# Patient Record
Sex: FEMALE | Race: WHITE | Employment: PART TIME | URBAN - NONMETROPOLITAN AREA
[De-identification: names, ages, dates, MRNs, and addresses within clinical notes are randomized per-mention and may not be internally consistent; named-entity substitution may affect disease eponyms.]

---

## 2017-01-05 DIAGNOSIS — S82.891S CLOSED RIGHT ANKLE FRACTURE, SEQUELA: Primary | ICD-10-CM

## 2017-01-10 ENCOUNTER — OFFICE VISIT (OUTPATIENT)
Dept: ORTHOPEDIC SURGERY | Age: 37
End: 2017-01-10

## 2017-01-10 VITALS
DIASTOLIC BLOOD PRESSURE: 90 MMHG | HEIGHT: 66 IN | HEART RATE: 89 BPM | SYSTOLIC BLOOD PRESSURE: 130 MMHG | BODY MASS INDEX: 24.11 KG/M2 | WEIGHT: 150 LBS

## 2017-01-10 DIAGNOSIS — S82.891S CLOSED RIGHT ANKLE FRACTURE, SEQUELA: Primary | ICD-10-CM

## 2017-01-10 PROCEDURE — 99024 POSTOP FOLLOW-UP VISIT: CPT | Performed by: PHYSICIAN ASSISTANT

## 2017-01-10 RX ORDER — SENNOSIDES 8.6 MG
650 CAPSULE ORAL EVERY 8 HOURS PRN
COMMUNITY
End: 2019-09-11

## 2017-01-10 RX ORDER — OXYCODONE HYDROCHLORIDE AND ACETAMINOPHEN 5; 325 MG/1; MG/1
1 TABLET ORAL EVERY 6 HOURS PRN
COMMUNITY
End: 2019-09-11

## 2017-01-16 ENCOUNTER — OFFICE VISIT (OUTPATIENT)
Dept: ORTHOPEDIC SURGERY | Age: 37
End: 2017-01-16

## 2017-01-16 VITALS
BODY MASS INDEX: 24.11 KG/M2 | HEART RATE: 88 BPM | SYSTOLIC BLOOD PRESSURE: 110 MMHG | DIASTOLIC BLOOD PRESSURE: 82 MMHG | WEIGHT: 150 LBS | HEIGHT: 66 IN

## 2017-01-16 DIAGNOSIS — S82.891S CLOSED RIGHT ANKLE FRACTURE, SEQUELA: Primary | ICD-10-CM

## 2017-01-16 PROCEDURE — 99024 POSTOP FOLLOW-UP VISIT: CPT | Performed by: PHYSICIAN ASSISTANT

## 2017-01-16 RX ORDER — CEPHALEXIN 500 MG/1
500 CAPSULE ORAL 4 TIMES DAILY
Qty: 40 CAPSULE | Refills: 0 | Status: SHIPPED | OUTPATIENT
Start: 2017-01-16 | End: 2017-03-20

## 2017-01-16 RX ORDER — HYDROCODONE BITARTRATE AND ACETAMINOPHEN 5; 325 MG/1; MG/1
1-2 TABLET ORAL EVERY 6 HOURS PRN
Qty: 50 TABLET | Refills: 0 | Status: SHIPPED | OUTPATIENT
Start: 2017-01-16 | End: 2017-01-23

## 2017-01-16 RX ORDER — METHYLPREDNISOLONE 4 MG/1
4 TABLET ORAL SEE ADMIN INSTRUCTIONS
Qty: 1 KIT | Refills: 0 | Status: SHIPPED | OUTPATIENT
Start: 2017-01-16 | End: 2017-01-22

## 2017-02-09 DIAGNOSIS — S82.891S CLOSED RIGHT ANKLE FRACTURE, SEQUELA: Primary | ICD-10-CM

## 2017-02-14 ENCOUNTER — OFFICE VISIT (OUTPATIENT)
Dept: ORTHOPEDIC SURGERY | Age: 37
End: 2017-02-14

## 2017-02-14 VITALS
BODY MASS INDEX: 24.11 KG/M2 | HEART RATE: 86 BPM | HEIGHT: 66 IN | WEIGHT: 150 LBS | DIASTOLIC BLOOD PRESSURE: 79 MMHG | SYSTOLIC BLOOD PRESSURE: 138 MMHG

## 2017-02-14 DIAGNOSIS — S82.891S CLOSED RIGHT ANKLE FRACTURE, SEQUELA: Primary | ICD-10-CM

## 2017-02-14 PROCEDURE — 99024 POSTOP FOLLOW-UP VISIT: CPT | Performed by: PHYSICIAN ASSISTANT

## 2017-02-14 RX ORDER — SULFAMETHOXAZOLE AND TRIMETHOPRIM 800; 160 MG/1; MG/1
1 TABLET ORAL 2 TIMES DAILY
Qty: 20 TABLET | Refills: 0 | Status: SHIPPED | OUTPATIENT
Start: 2017-02-14 | End: 2017-02-24

## 2017-02-23 DIAGNOSIS — S82.891S CLOSED RIGHT ANKLE FRACTURE, SEQUELA: Primary | ICD-10-CM

## 2017-02-28 ENCOUNTER — OFFICE VISIT (OUTPATIENT)
Dept: ORTHOPEDIC SURGERY | Age: 37
End: 2017-02-28

## 2017-02-28 VITALS
HEIGHT: 66 IN | DIASTOLIC BLOOD PRESSURE: 64 MMHG | HEART RATE: 80 BPM | BODY MASS INDEX: 24.11 KG/M2 | SYSTOLIC BLOOD PRESSURE: 118 MMHG | WEIGHT: 150 LBS

## 2017-02-28 DIAGNOSIS — S82.891S CLOSED RIGHT ANKLE FRACTURE, SEQUELA: Primary | ICD-10-CM

## 2017-02-28 PROCEDURE — 99024 POSTOP FOLLOW-UP VISIT: CPT | Performed by: PHYSICIAN ASSISTANT

## 2017-03-16 DIAGNOSIS — S82.891S CLOSED RIGHT ANKLE FRACTURE, SEQUELA: Primary | ICD-10-CM

## 2017-03-20 ENCOUNTER — OFFICE VISIT (OUTPATIENT)
Dept: ORTHOPEDIC SURGERY | Age: 37
End: 2017-03-20

## 2017-03-20 VITALS
DIASTOLIC BLOOD PRESSURE: 74 MMHG | WEIGHT: 150 LBS | BODY MASS INDEX: 24.11 KG/M2 | HEART RATE: 80 BPM | HEIGHT: 66 IN | SYSTOLIC BLOOD PRESSURE: 118 MMHG

## 2017-03-20 DIAGNOSIS — S82.891S CLOSED RIGHT ANKLE FRACTURE, SEQUELA: Primary | ICD-10-CM

## 2017-03-20 PROCEDURE — 99024 POSTOP FOLLOW-UP VISIT: CPT | Performed by: PHYSICIAN ASSISTANT

## 2017-03-24 ENCOUNTER — NURSE ONLY (OUTPATIENT)
Dept: ORTHOPEDIC SURGERY | Age: 37
End: 2017-03-24

## 2017-03-24 DIAGNOSIS — S91.001S ANKLE WOUND, RIGHT, SEQUELA: Primary | ICD-10-CM

## 2017-03-28 ENCOUNTER — OFFICE VISIT (OUTPATIENT)
Dept: ORTHOPEDIC SURGERY | Age: 37
End: 2017-03-28
Payer: COMMERCIAL

## 2017-03-28 VITALS
WEIGHT: 150 LBS | HEIGHT: 66 IN | HEART RATE: 68 BPM | SYSTOLIC BLOOD PRESSURE: 128 MMHG | BODY MASS INDEX: 24.11 KG/M2 | DIASTOLIC BLOOD PRESSURE: 82 MMHG

## 2017-03-28 DIAGNOSIS — S91.001S ANKLE WOUND, RIGHT, SEQUELA: Primary | ICD-10-CM

## 2017-03-28 PROCEDURE — 99212 OFFICE O/P EST SF 10 MIN: CPT | Performed by: PHYSICIAN ASSISTANT

## 2017-03-28 RX ORDER — SULFAMETHOXAZOLE AND TRIMETHOPRIM 800; 160 MG/1; MG/1
1 TABLET ORAL 2 TIMES DAILY
Qty: 20 TABLET | Refills: 0 | Status: SHIPPED | OUTPATIENT
Start: 2017-03-28 | End: 2017-04-07

## 2017-04-03 ENCOUNTER — OFFICE VISIT (OUTPATIENT)
Dept: ORTHOPEDIC SURGERY | Age: 37
End: 2017-04-03
Payer: COMMERCIAL

## 2017-04-03 VITALS
SYSTOLIC BLOOD PRESSURE: 125 MMHG | HEART RATE: 81 BPM | HEIGHT: 66 IN | BODY MASS INDEX: 24.11 KG/M2 | WEIGHT: 150 LBS | DIASTOLIC BLOOD PRESSURE: 83 MMHG

## 2017-04-03 DIAGNOSIS — S91.001S ANKLE WOUND, RIGHT, SEQUELA: Primary | ICD-10-CM

## 2017-04-03 PROCEDURE — 99212 OFFICE O/P EST SF 10 MIN: CPT | Performed by: PHYSICIAN ASSISTANT

## 2017-04-20 DIAGNOSIS — S82.891S CLOSED RIGHT ANKLE FRACTURE, SEQUELA: Primary | ICD-10-CM

## 2017-04-25 ENCOUNTER — OFFICE VISIT (OUTPATIENT)
Dept: ORTHOPEDIC SURGERY | Age: 37
End: 2017-04-25
Payer: COMMERCIAL

## 2017-04-25 VITALS
WEIGHT: 150 LBS | SYSTOLIC BLOOD PRESSURE: 130 MMHG | BODY MASS INDEX: 24.11 KG/M2 | DIASTOLIC BLOOD PRESSURE: 72 MMHG | HEIGHT: 66 IN

## 2017-04-25 DIAGNOSIS — S82.891S CLOSED RIGHT ANKLE FRACTURE, SEQUELA: Primary | ICD-10-CM

## 2017-04-25 PROCEDURE — 99212 OFFICE O/P EST SF 10 MIN: CPT | Performed by: PHYSICIAN ASSISTANT

## 2018-04-12 DIAGNOSIS — G89.29 CHRONIC PAIN OF RIGHT ANKLE: Primary | ICD-10-CM

## 2018-04-12 DIAGNOSIS — M25.571 CHRONIC PAIN OF RIGHT ANKLE: Primary | ICD-10-CM

## 2019-09-11 ENCOUNTER — OFFICE VISIT (OUTPATIENT)
Dept: FAMILY MEDICINE CLINIC | Age: 39
End: 2019-09-11
Payer: COMMERCIAL

## 2019-09-11 VITALS
BODY MASS INDEX: 28.99 KG/M2 | HEIGHT: 65 IN | SYSTOLIC BLOOD PRESSURE: 132 MMHG | DIASTOLIC BLOOD PRESSURE: 82 MMHG | HEART RATE: 100 BPM | WEIGHT: 174 LBS | OXYGEN SATURATION: 98 % | TEMPERATURE: 99.2 F

## 2019-09-11 DIAGNOSIS — L03.115 CELLULITIS OF RIGHT LOWER EXTREMITY: Primary | ICD-10-CM

## 2019-09-11 PROCEDURE — 99202 OFFICE O/P NEW SF 15 MIN: CPT | Performed by: NURSE PRACTITIONER

## 2019-09-11 ASSESSMENT — PATIENT HEALTH QUESTIONNAIRE - PHQ9
1. LITTLE INTEREST OR PLEASURE IN DOING THINGS: 0
SUM OF ALL RESPONSES TO PHQ9 QUESTIONS 1 & 2: 0
SUM OF ALL RESPONSES TO PHQ QUESTIONS 1-9: 0
2. FEELING DOWN, DEPRESSED OR HOPELESS: 0
SUM OF ALL RESPONSES TO PHQ QUESTIONS 1-9: 0

## 2019-09-11 ASSESSMENT — ENCOUNTER SYMPTOMS: COLOR CHANGE: 1

## 2019-09-11 NOTE — PROGRESS NOTES
Nausea Only     Ok if takes with benadryl    Betadine [Povidone Iodine] Rash       No exam data present    Subjective:      Review of Systems   Musculoskeletal: Positive for joint swelling. Skin: Positive for color change, rash and wound. Objective:     /82 (Site: Right Upper Arm, Position: Sitting, Cuff Size: Medium Adult)   Pulse 100   Temp 99.2 °F (37.3 °C)   Ht 5' 5\" (1.651 m)   Wt 174 lb (78.9 kg)   LMP 08/19/2019 (Exact Date)   SpO2 98%   Breastfeeding? No   BMI 28.96 kg/m²     Physical Exam   Constitutional: She appears well-developed and well-nourished. Musculoskeletal: She exhibits edema and tenderness. She exhibits no deformity. Right ankle: She exhibits swelling. She exhibits normal range of motion, no ecchymosis, no deformity, no laceration and normal pulse. Tenderness. Lateral malleolus tenderness found. Feet:    Skin: Skin is warm. Capillary refill takes less than 2 seconds. Rash noted. There is erythema. Assessment:      Diagnosis Orders   1. Cellulitis of right lower extremity       No results found for this visit on 09/11/19. Possible infected hardware. Plan:       Dr. Dav Krishnan, at [facility] will call you tomorrow with further instructions. Follow up with him as scheduled. [phone]    Update:  [facility] notified me that Dr. Ivet Nix would like to take the patient to the OR for irrigation of the area. Patient has already left the building. Patient to be contacted by [facility] to set up time tomorrow. Patient Instructions     Dr. Norris Gomez nurse, [facility], at [facility] will call you tomorrow with further instructions. Follow up with him as scheduled. [phone]      Patient Education        Cellulitis: Care Instructions  Your Care Instructions    Cellulitis is a skin infection caused by bacteria, most often strep or staph.  It often occurs after a break in the skin from a scrape, cut, bite, or puncture, or after a peroxide or alcohol, which can slow healing. · If you have swelling in your legs (edema), support stockings and good skin care may help prevent leg sores and cellulitis. · Take care of your feet, especially if you have diabetes or other conditions that increase the risk of infection. Wear shoes and socks. Do not go barefoot. If you have athlete's foot or other skin problems on your feet, talk to your doctor about how to treat them. When should you call for help? Call your doctor now or seek immediate medical care if:    · You have signs that your infection is getting worse, such as:  ? Increased pain, swelling, warmth, or redness. ? Red streaks leading from the area. ? Pus draining from the area. ? A fever.     · You get a rash.    Watch closely for changes in your health, and be sure to contact your doctor if:    · You do not get better as expected. Where can you learn more? Go to https://[employer]. org and sign in to your [employer] account. Enter C439 in the [employer] box to learn more about \"Cellulitis: Care Instructions. \"     If you do not have an account, please click on the \"Sign Up Now\" link. Current as of: April 1, 2019  Content Version: 12.1  © 2004-7356 Healthwise, Incorporated. Care instructions adapted under license by [facility] [place] ([facility]). If you have questions about a medical condition or this instruction, always ask your healthcare professional. [place] [address] any warranty or liability for your use of this information. Patient/Caregiver instructed on use, benefit, and side effects of prescribed medications. All patient/parent/caregiver questions answered. Patient/parent/caregiver voiced understanding. Reviewed health maintenance. Instructed to continue current medications, diet and exercise. Patient agreed with treatment plan. Follow up as directed.            Electronically signed by LOREN Levi CNP on9/11/2019

## 2019-09-12 DIAGNOSIS — M25.571 CHRONIC PAIN OF RIGHT ANKLE: ICD-10-CM

## 2019-09-12 DIAGNOSIS — Z01.818 PRE-OP TESTING: Primary | ICD-10-CM

## 2019-09-12 DIAGNOSIS — G89.29 CHRONIC PAIN OF RIGHT ANKLE: ICD-10-CM

## 2019-09-13 ENCOUNTER — HOSPITAL ENCOUNTER (OUTPATIENT)
Dept: LAB | Age: 39
Discharge: HOME | End: 2019-09-13
Payer: COMMERCIAL

## 2019-09-13 ENCOUNTER — HOSPITAL ENCOUNTER (OUTPATIENT)
Dept: NON INVASIVE DIAGNOSTICS | Age: 39
Discharge: HOME | End: 2019-09-13
Payer: COMMERCIAL

## 2019-09-13 DIAGNOSIS — Z01.818 PRE-OP TESTING: ICD-10-CM

## 2019-09-13 LAB
ABSOLUTE EOS #: 0.2 K/UL (ref 0–0.4)
ABSOLUTE IMMATURE GRANULOCYTE: NORMAL K/UL (ref 0–0.3)
ABSOLUTE LYMPH #: 1.7 K/UL (ref 1–4.8)
ABSOLUTE MONO #: 0.5 K/UL (ref 0.1–1.2)
ANION GAP SERPL CALCULATED.3IONS-SCNC: 11 MMOL/L (ref 9–17)
BASOPHILS # BLD: 1 % (ref 0–1)
BASOPHILS ABSOLUTE: 0 K/UL (ref 0–0.2)
BUN BLDV-MCNC: 11 MG/DL (ref 6–20)
BUN/CREAT BLD: 15 (ref 9–20)
CALCIUM SERPL-MCNC: 9.6 MG/DL (ref 8.6–10.4)
CHLORIDE BLD-SCNC: 104 MMOL/L (ref 98–107)
CO2: 25 MMOL/L (ref 20–31)
CREAT SERPL-MCNC: 0.72 MG/DL (ref 0.5–0.9)
DIFFERENTIAL TYPE: NORMAL
EKG ATRIAL RATE: 75 BPM
EKG P AXIS: 62 DEGREES
EKG P-R INTERVAL: 118 MS
EKG Q-T INTERVAL: 370 MS
EKG QRS DURATION: 80 MS
EKG QTC CALCULATION (BAZETT): 413 MS
EKG R AXIS: 54 DEGREES
EKG T AXIS: 24 DEGREES
EKG VENTRICULAR RATE: 75 BPM
EOSINOPHILS RELATIVE PERCENT: 3 % (ref 1–7)
GFR AFRICAN AMERICAN: >60 ML/MIN
GFR NON-AFRICAN AMERICAN: >60 ML/MIN
GFR SERPL CREATININE-BSD FRML MDRD: ABNORMAL ML/MIN/{1.73_M2}
GFR SERPL CREATININE-BSD FRML MDRD: ABNORMAL ML/MIN/{1.73_M2}
GLUCOSE BLD-MCNC: 105 MG/DL (ref 70–99)
HCT VFR BLD CALC: 39.5 % (ref 36–46)
HEMOGLOBIN: 13.1 G/DL (ref 12–16)
IMMATURE GRANULOCYTES: NORMAL %
LYMPHOCYTES # BLD: 24 % (ref 16–46)
MCH RBC QN AUTO: 29.1 PG (ref 26–34)
MCHC RBC AUTO-ENTMCNC: 33.1 G/DL (ref 31–37)
MCV RBC AUTO: 88 FL (ref 80–100)
MONOCYTES # BLD: 7 % (ref 4–11)
NRBC AUTOMATED: NORMAL PER 100 WBC
PDW BLD-RTO: 14.4 % (ref 11–14.5)
PLATELET # BLD: 352 K/UL (ref 140–450)
PLATELET ESTIMATE: NORMAL
PMV BLD AUTO: 7 FL (ref 6–12)
POTASSIUM SERPL-SCNC: 3.9 MMOL/L (ref 3.7–5.3)
RBC # BLD: 4.49 M/UL (ref 4–5.2)
RBC # BLD: NORMAL 10*6/UL
SEG NEUTROPHILS: 65 % (ref 43–77)
SEGMENTED NEUTROPHILS ABSOLUTE COUNT: 4.5 K/UL (ref 1.8–7.7)
SODIUM BLD-SCNC: 140 MMOL/L (ref 135–144)
WBC # BLD: 6.9 K/UL (ref 3.5–11)
WBC # BLD: NORMAL 10*3/UL

## 2019-09-13 PROCEDURE — 80048 BASIC METABOLIC PNL TOTAL CA: CPT

## 2019-09-13 PROCEDURE — 85025 COMPLETE CBC W/AUTO DIFF WBC: CPT

## 2019-09-13 PROCEDURE — 93005 ELECTROCARDIOGRAM TRACING: CPT

## 2019-09-13 PROCEDURE — 36415 COLL VENOUS BLD VENIPUNCTURE: CPT

## 2019-09-17 ENCOUNTER — APPOINTMENT (OUTPATIENT)
Dept: GENERAL RADIOLOGY | Age: 39
End: 2019-09-17
Attending: ORTHOPAEDIC SURGERY
Payer: COMMERCIAL

## 2019-09-17 ENCOUNTER — HOSPITAL ENCOUNTER (OUTPATIENT)
Age: 39
Setting detail: OUTPATIENT SURGERY
Discharge: HOME | End: 2019-09-17
Attending: ORTHOPAEDIC SURGERY | Admitting: ORTHOPAEDIC SURGERY
Payer: COMMERCIAL

## 2019-09-17 ENCOUNTER — ANESTHESIA EVENT (OUTPATIENT)
Dept: OPERATING ROOM | Age: 39
End: 2019-09-17
Payer: COMMERCIAL

## 2019-09-17 ENCOUNTER — HOSPITAL ENCOUNTER (OUTPATIENT)
Dept: GENERAL RADIOLOGY | Age: 39
Discharge: HOME | End: 2019-09-19
Payer: COMMERCIAL

## 2019-09-17 ENCOUNTER — OFFICE VISIT (OUTPATIENT)
Dept: ORTHOPEDIC SURGERY | Age: 39
End: 2019-09-17
Payer: COMMERCIAL

## 2019-09-17 ENCOUNTER — ANESTHESIA (OUTPATIENT)
Dept: OPERATING ROOM | Age: 39
End: 2019-09-17
Payer: COMMERCIAL

## 2019-09-17 VITALS
BODY MASS INDEX: 28.99 KG/M2 | SYSTOLIC BLOOD PRESSURE: 104 MMHG | WEIGHT: 174 LBS | DIASTOLIC BLOOD PRESSURE: 64 MMHG | HEART RATE: 74 BPM | HEIGHT: 65 IN

## 2019-09-17 VITALS
OXYGEN SATURATION: 98 % | RESPIRATION RATE: 16 BRPM | TEMPERATURE: 97.4 F | WEIGHT: 170 LBS | HEIGHT: 66 IN | DIASTOLIC BLOOD PRESSURE: 75 MMHG | SYSTOLIC BLOOD PRESSURE: 119 MMHG | HEART RATE: 82 BPM | BODY MASS INDEX: 27.32 KG/M2

## 2019-09-17 VITALS
SYSTOLIC BLOOD PRESSURE: 109 MMHG | OXYGEN SATURATION: 100 % | TEMPERATURE: 96.2 F | RESPIRATION RATE: 12 BRPM | DIASTOLIC BLOOD PRESSURE: 64 MMHG

## 2019-09-17 DIAGNOSIS — Z01.818 PRE-OP TESTING: ICD-10-CM

## 2019-09-17 DIAGNOSIS — T81.30XA WOUND DEHISCENCE: Primary | ICD-10-CM

## 2019-09-17 DIAGNOSIS — M25.571 CHRONIC PAIN OF RIGHT ANKLE: ICD-10-CM

## 2019-09-17 DIAGNOSIS — G89.29 CHRONIC PAIN OF RIGHT ANKLE: ICD-10-CM

## 2019-09-17 DIAGNOSIS — Z98.890 S/P HARDWARE REMOVAL: Primary | ICD-10-CM

## 2019-09-17 PROBLEM — T84.84XA PAINFUL ORTHOPAEDIC HARDWARE (HCC): Status: ACTIVE | Noted: 2019-09-17

## 2019-09-17 LAB — HCG(URINE) PREGNANCY TEST: NEGATIVE

## 2019-09-17 PROCEDURE — 6370000000 HC RX 637 (ALT 250 FOR IP): Performed by: NURSE ANESTHETIST, CERTIFIED REGISTERED

## 2019-09-17 PROCEDURE — 87186 SC STD MICRODIL/AGAR DIL: CPT

## 2019-09-17 PROCEDURE — 2580000003 HC RX 258: Performed by: ORTHOPAEDIC SURGERY

## 2019-09-17 PROCEDURE — 7100000000 HC PACU RECOVERY - FIRST 15 MIN: Performed by: ORTHOPAEDIC SURGERY

## 2019-09-17 PROCEDURE — 87205 SMEAR GRAM STAIN: CPT

## 2019-09-17 PROCEDURE — 87075 CULTR BACTERIA EXCEPT BLOOD: CPT

## 2019-09-17 PROCEDURE — 99212 OFFICE O/P EST SF 10 MIN: CPT | Performed by: PHYSICIAN ASSISTANT

## 2019-09-17 PROCEDURE — 7100000011 HC PHASE II RECOVERY - ADDTL 15 MIN: Performed by: ORTHOPAEDIC SURGERY

## 2019-09-17 PROCEDURE — 6370000000 HC RX 637 (ALT 250 FOR IP): Performed by: NURSE PRACTITIONER

## 2019-09-17 PROCEDURE — 7100000001 HC PACU RECOVERY - ADDTL 15 MIN: Performed by: ORTHOPAEDIC SURGERY

## 2019-09-17 PROCEDURE — 3600000002 HC SURGERY LEVEL 2 BASE: Performed by: ORTHOPAEDIC SURGERY

## 2019-09-17 PROCEDURE — 27641 PARTIAL REMOVAL OF FIBULA: CPT | Performed by: ORTHOPAEDIC SURGERY

## 2019-09-17 PROCEDURE — 73600 X-RAY EXAM OF ANKLE: CPT

## 2019-09-17 PROCEDURE — 3700000000 HC ANESTHESIA ATTENDED CARE: Performed by: ORTHOPAEDIC SURGERY

## 2019-09-17 PROCEDURE — 3700000001 HC ADD 15 MINUTES (ANESTHESIA): Performed by: ORTHOPAEDIC SURGERY

## 2019-09-17 PROCEDURE — 2500000003 HC RX 250 WO HCPCS: Performed by: ORTHOPAEDIC SURGERY

## 2019-09-17 PROCEDURE — 6360000002 HC RX W HCPCS: Performed by: NURSE ANESTHETIST, CERTIFIED REGISTERED

## 2019-09-17 PROCEDURE — 81025 URINE PREGNANCY TEST: CPT

## 2019-09-17 PROCEDURE — 87176 TISSUE HOMOGENIZATION CULTR: CPT

## 2019-09-17 PROCEDURE — 6360000002 HC RX W HCPCS: Performed by: ORTHOPAEDIC SURGERY

## 2019-09-17 PROCEDURE — 3209999900 FLUORO FOR SURGICAL PROCEDURES

## 2019-09-17 PROCEDURE — 2709999900 HC NON-CHARGEABLE SUPPLY: Performed by: ORTHOPAEDIC SURGERY

## 2019-09-17 PROCEDURE — 87070 CULTURE OTHR SPECIMN AEROBIC: CPT

## 2019-09-17 PROCEDURE — 7100000010 HC PHASE II RECOVERY - FIRST 15 MIN: Performed by: ORTHOPAEDIC SURGERY

## 2019-09-17 PROCEDURE — 73610 X-RAY EXAM OF ANKLE: CPT

## 2019-09-17 PROCEDURE — 2580000003 HC RX 258: Performed by: NURSE ANESTHETIST, CERTIFIED REGISTERED

## 2019-09-17 PROCEDURE — 6360000002 HC RX W HCPCS: Performed by: NURSE PRACTITIONER

## 2019-09-17 PROCEDURE — 86403 PARTICLE AGGLUT ANTBDY SCRN: CPT

## 2019-09-17 PROCEDURE — 3600000012 HC SURGERY LEVEL 2 ADDTL 15MIN: Performed by: ORTHOPAEDIC SURGERY

## 2019-09-17 RX ORDER — ACETAMINOPHEN 650 MG/1
SUPPOSITORY RECTAL PRN
Status: DISCONTINUED | OUTPATIENT
Start: 2019-09-17 | End: 2019-09-17 | Stop reason: SDUPTHER

## 2019-09-17 RX ORDER — SCOLOPAMINE TRANSDERMAL SYSTEM 1 MG/1
PATCH, EXTENDED RELEASE TRANSDERMAL PRN
Status: DISCONTINUED | OUTPATIENT
Start: 2019-09-17 | End: 2019-09-17 | Stop reason: SDUPTHER

## 2019-09-17 RX ORDER — DIPHENHYDRAMINE HYDROCHLORIDE 50 MG/ML
INJECTION INTRAMUSCULAR; INTRAVENOUS PRN
Status: DISCONTINUED | OUTPATIENT
Start: 2019-09-17 | End: 2019-09-17 | Stop reason: SDUPTHER

## 2019-09-17 RX ORDER — FENTANYL CITRATE 50 UG/ML
50 INJECTION, SOLUTION INTRAMUSCULAR; INTRAVENOUS EVERY 5 MIN PRN
Status: DISCONTINUED | OUTPATIENT
Start: 2019-09-17 | End: 2019-09-17 | Stop reason: HOSPADM

## 2019-09-17 RX ORDER — BUPIVACAINE HYDROCHLORIDE AND EPINEPHRINE 5; 5 MG/ML; UG/ML
INJECTION, SOLUTION EPIDURAL; INTRACAUDAL; PERINEURAL PRN
Status: DISCONTINUED | OUTPATIENT
Start: 2019-09-17 | End: 2019-09-17 | Stop reason: ALTCHOICE

## 2019-09-17 RX ORDER — SODIUM CHLORIDE 0.9 % (FLUSH) 0.9 %
10 SYRINGE (ML) INJECTION EVERY 12 HOURS SCHEDULED
Status: DISCONTINUED | OUTPATIENT
Start: 2019-09-17 | End: 2019-09-17 | Stop reason: HOSPADM

## 2019-09-17 RX ORDER — SODIUM CHLORIDE, SODIUM LACTATE, POTASSIUM CHLORIDE, CALCIUM CHLORIDE 600; 310; 30; 20 MG/100ML; MG/100ML; MG/100ML; MG/100ML
INJECTION, SOLUTION INTRAVENOUS CONTINUOUS PRN
Status: DISCONTINUED | OUTPATIENT
Start: 2019-09-17 | End: 2019-09-17 | Stop reason: SDUPTHER

## 2019-09-17 RX ORDER — KETOROLAC TROMETHAMINE 30 MG/ML
INJECTION, SOLUTION INTRAMUSCULAR; INTRAVENOUS PRN
Status: DISCONTINUED | OUTPATIENT
Start: 2019-09-17 | End: 2019-09-17 | Stop reason: SDUPTHER

## 2019-09-17 RX ORDER — HYDROCODONE BITARTRATE AND ACETAMINOPHEN 5; 325 MG/1; MG/1
1 TABLET ORAL PRN
Status: COMPLETED | OUTPATIENT
Start: 2019-09-17 | End: 2019-09-17

## 2019-09-17 RX ORDER — ONDANSETRON 2 MG/ML
4 INJECTION INTRAMUSCULAR; INTRAVENOUS PRN
Status: DISCONTINUED | OUTPATIENT
Start: 2019-09-17 | End: 2019-09-17 | Stop reason: HOSPADM

## 2019-09-17 RX ORDER — DIPHENHYDRAMINE HYDROCHLORIDE 50 MG/ML
12.5 INJECTION INTRAMUSCULAR; INTRAVENOUS
Status: DISCONTINUED | OUTPATIENT
Start: 2019-09-17 | End: 2019-09-17 | Stop reason: HOSPADM

## 2019-09-17 RX ORDER — ONDANSETRON 2 MG/ML
INJECTION INTRAMUSCULAR; INTRAVENOUS PRN
Status: DISCONTINUED | OUTPATIENT
Start: 2019-09-17 | End: 2019-09-17 | Stop reason: SDUPTHER

## 2019-09-17 RX ORDER — SODIUM CHLORIDE 9 MG/ML
INJECTION, SOLUTION INTRAVENOUS CONTINUOUS
Status: DISCONTINUED | OUTPATIENT
Start: 2019-09-17 | End: 2019-09-17 | Stop reason: HOSPADM

## 2019-09-17 RX ORDER — SODIUM CHLORIDE, SODIUM LACTATE, POTASSIUM CHLORIDE, CALCIUM CHLORIDE 600; 310; 30; 20 MG/100ML; MG/100ML; MG/100ML; MG/100ML
INJECTION, SOLUTION INTRAVENOUS CONTINUOUS
Status: DISCONTINUED | OUTPATIENT
Start: 2019-09-17 | End: 2019-09-17 | Stop reason: HOSPADM

## 2019-09-17 RX ORDER — SULFAMETHOXAZOLE AND TRIMETHOPRIM 800; 160 MG/1; MG/1
1 TABLET ORAL 2 TIMES DAILY
Qty: 20 TABLET | Refills: 0 | Status: SHIPPED | OUTPATIENT
Start: 2019-09-17 | End: 2019-09-17 | Stop reason: SDUPTHER

## 2019-09-17 RX ORDER — SODIUM CHLORIDE 0.9 % (FLUSH) 0.9 %
10 SYRINGE (ML) INJECTION PRN
Status: DISCONTINUED | OUTPATIENT
Start: 2019-09-17 | End: 2019-09-17 | Stop reason: HOSPADM

## 2019-09-17 RX ORDER — SULFAMETHOXAZOLE AND TRIMETHOPRIM 800; 160 MG/1; MG/1
1 TABLET ORAL 2 TIMES DAILY
Qty: 20 TABLET | Refills: 0 | Status: SHIPPED | OUTPATIENT
Start: 2019-09-17 | End: 2019-09-27

## 2019-09-17 RX ORDER — FENTANYL CITRATE 50 UG/ML
25 INJECTION, SOLUTION INTRAMUSCULAR; INTRAVENOUS EVERY 5 MIN PRN
Status: DISCONTINUED | OUTPATIENT
Start: 2019-09-17 | End: 2019-09-17 | Stop reason: HOSPADM

## 2019-09-17 RX ORDER — MIDAZOLAM HYDROCHLORIDE 1 MG/ML
INJECTION INTRAMUSCULAR; INTRAVENOUS PRN
Status: DISCONTINUED | OUTPATIENT
Start: 2019-09-17 | End: 2019-09-17 | Stop reason: SDUPTHER

## 2019-09-17 RX ORDER — FENTANYL CITRATE 50 UG/ML
INJECTION, SOLUTION INTRAMUSCULAR; INTRAVENOUS PRN
Status: DISCONTINUED | OUTPATIENT
Start: 2019-09-17 | End: 2019-09-17 | Stop reason: SDUPTHER

## 2019-09-17 RX ORDER — HYDROCODONE BITARTRATE AND ACETAMINOPHEN 5; 325 MG/1; MG/1
2 TABLET ORAL PRN
Status: COMPLETED | OUTPATIENT
Start: 2019-09-17 | End: 2019-09-17

## 2019-09-17 RX ORDER — DEXAMETHASONE SODIUM PHOSPHATE 4 MG/ML
INJECTION, SOLUTION INTRA-ARTICULAR; INTRALESIONAL; INTRAMUSCULAR; INTRAVENOUS; SOFT TISSUE PRN
Status: DISCONTINUED | OUTPATIENT
Start: 2019-09-17 | End: 2019-09-17 | Stop reason: SDUPTHER

## 2019-09-17 RX ORDER — HYDROCODONE BITARTRATE AND ACETAMINOPHEN 5; 325 MG/1; MG/1
1-2 TABLET ORAL
Qty: 30 TABLET | Refills: 0 | Status: SHIPPED | OUTPATIENT
Start: 2019-09-17 | End: 2019-09-24

## 2019-09-17 RX ADMIN — FENTANYL CITRATE 50 MCG: 50 INJECTION, SOLUTION INTRAMUSCULAR; INTRAVENOUS at 09:59

## 2019-09-17 RX ADMIN — HYDROMORPHONE HYDROCHLORIDE 0.25 MG: 1 INJECTION, SOLUTION INTRAMUSCULAR; INTRAVENOUS; SUBCUTANEOUS at 11:21

## 2019-09-17 RX ADMIN — SODIUM CHLORIDE, POTASSIUM CHLORIDE, SODIUM LACTATE AND CALCIUM CHLORIDE: 600; 310; 30; 20 INJECTION, SOLUTION INTRAVENOUS at 11:14

## 2019-09-17 RX ADMIN — SCOPALAMINE 1 PATCH: 1 PATCH, EXTENDED RELEASE TRANSDERMAL at 09:17

## 2019-09-17 RX ADMIN — FENTANYL CITRATE 50 MCG: 50 INJECTION, SOLUTION INTRAMUSCULAR; INTRAVENOUS at 09:50

## 2019-09-17 RX ADMIN — DIPHENHYDRAMINE HYDROCHLORIDE 15 MG: 50 INJECTION, SOLUTION INTRAMUSCULAR; INTRAVENOUS at 10:05

## 2019-09-17 RX ADMIN — MIDAZOLAM HYDROCHLORIDE 1 MG: 1 INJECTION, SOLUTION INTRAMUSCULAR; INTRAVENOUS at 09:50

## 2019-09-17 RX ADMIN — SODIUM CHLORIDE, POTASSIUM CHLORIDE, SODIUM LACTATE AND CALCIUM CHLORIDE: 600; 310; 30; 20 INJECTION, SOLUTION INTRAVENOUS at 09:05

## 2019-09-17 RX ADMIN — FENTANYL CITRATE 50 MCG: 50 INJECTION, SOLUTION INTRAMUSCULAR; INTRAVENOUS at 10:34

## 2019-09-17 RX ADMIN — ONDANSETRON 4 MG: 2 INJECTION INTRAMUSCULAR; INTRAVENOUS at 10:05

## 2019-09-17 RX ADMIN — MIDAZOLAM HYDROCHLORIDE 1 MG: 1 INJECTION, SOLUTION INTRAMUSCULAR; INTRAVENOUS at 09:59

## 2019-09-17 RX ADMIN — Medication 2 G: at 10:15

## 2019-09-17 RX ADMIN — KETOROLAC TROMETHAMINE 30 MG: 30 INJECTION, SOLUTION INTRAMUSCULAR; INTRAVENOUS at 10:20

## 2019-09-17 RX ADMIN — HYDROMORPHONE HYDROCHLORIDE 0.25 MG: 1 INJECTION, SOLUTION INTRAMUSCULAR; INTRAVENOUS; SUBCUTANEOUS at 11:02

## 2019-09-17 RX ADMIN — FENTANYL CITRATE 50 MCG: 50 INJECTION, SOLUTION INTRAMUSCULAR; INTRAVENOUS at 10:26

## 2019-09-17 RX ADMIN — DEXAMETHASONE SODIUM PHOSPHATE 4 MG: 4 INJECTION, SOLUTION INTRAMUSCULAR; INTRAVENOUS at 10:05

## 2019-09-17 RX ADMIN — SODIUM CHLORIDE, POTASSIUM CHLORIDE, SODIUM LACTATE AND CALCIUM CHLORIDE: 600; 310; 30; 20 INJECTION, SOLUTION INTRAVENOUS at 09:54

## 2019-09-17 RX ADMIN — HYDROCODONE BITARTRATE AND ACETAMINOPHEN 1 TABLET: 5; 325 TABLET ORAL at 12:04

## 2019-09-17 RX ADMIN — ACETAMINOPHEN 650 MG: 650 SUPPOSITORY RECTAL at 10:01

## 2019-09-17 ASSESSMENT — PULMONARY FUNCTION TESTS
PIF_VALUE: 10
PIF_VALUE: 11
PIF_VALUE: 9
PIF_VALUE: 9
PIF_VALUE: 11
PIF_VALUE: 7
PIF_VALUE: 3
PIF_VALUE: 3
PIF_VALUE: 1
PIF_VALUE: 7
PIF_VALUE: 9
PIF_VALUE: 7
PIF_VALUE: 7
PIF_VALUE: 11
PIF_VALUE: 7
PIF_VALUE: 11
PIF_VALUE: 9
PIF_VALUE: 11
PIF_VALUE: 7
PIF_VALUE: 9
PIF_VALUE: 0
PIF_VALUE: 0
PIF_VALUE: 7
PIF_VALUE: 1
PIF_VALUE: 9
PIF_VALUE: 7
PIF_VALUE: 11
PIF_VALUE: 3
PIF_VALUE: 11
PIF_VALUE: 2
PIF_VALUE: 3
PIF_VALUE: 3
PIF_VALUE: 7
PIF_VALUE: 7
PIF_VALUE: 9

## 2019-09-17 ASSESSMENT — PAIN DESCRIPTION - DESCRIPTORS
DESCRIPTORS: BURNING;THROBBING
DESCRIPTORS: BURNING
DESCRIPTORS: BURNING
DESCRIPTORS: SHARP
DESCRIPTORS: BURNING;THROBBING
DESCRIPTORS: BURNING
DESCRIPTORS: BURNING
DESCRIPTORS: BURNING;THROBBING
DESCRIPTORS: BURNING

## 2019-09-17 ASSESSMENT — PAIN DESCRIPTION - PAIN TYPE
TYPE: SURGICAL PAIN

## 2019-09-17 ASSESSMENT — PAIN SCALES - GENERAL
PAINLEVEL_OUTOF10: 4
PAINLEVEL_OUTOF10: 6
PAINLEVEL_OUTOF10: 6
PAINLEVEL_OUTOF10: 4
PAINLEVEL_OUTOF10: 3
PAINLEVEL_OUTOF10: 6
PAINLEVEL_OUTOF10: 3
PAINLEVEL_OUTOF10: 3
PAINLEVEL_OUTOF10: 4
PAINLEVEL_OUTOF10: 6
PAINLEVEL_OUTOF10: 5
PAINLEVEL_OUTOF10: 5
PAINLEVEL_OUTOF10: 3
PAINLEVEL_OUTOF10: 4
PAINLEVEL_OUTOF10: 2
PAINLEVEL_OUTOF10: 5
PAINLEVEL_OUTOF10: 6

## 2019-09-17 ASSESSMENT — PAIN DESCRIPTION - LOCATION
LOCATION: ANKLE

## 2019-09-17 ASSESSMENT — PAIN DESCRIPTION - ORIENTATION
ORIENTATION: RIGHT

## 2019-09-17 ASSESSMENT — PAIN DESCRIPTION - FREQUENCY
FREQUENCY: CONTINUOUS
FREQUENCY: INTERMITTENT
FREQUENCY: CONTINUOUS

## 2019-09-17 ASSESSMENT — PAIN - FUNCTIONAL ASSESSMENT: PAIN_FUNCTIONAL_ASSESSMENT: 0-10

## 2019-09-17 NOTE — LETTER
[address] [place] [address]  OR  [address] [place] [address] [address] [address]  Phone: [phone]          September 17, 2019     Patient: Edilma Preciado   YOB: 1980   Date of Visit: 9/17/19       To Whom It May Concern:    Please excuse Mary from work from Tuesday September 17 through Tuesday October 1 as she had surgery at [facility]. If you have any questions or concerns, please don't hesitate to call.     Sincerely,        Dr. Christina Pearson MD

## 2019-09-17 NOTE — ANESTHESIA POSTPROCEDURE EVALUATION
Department of Anesthesiology  Postprocedure Note    Patient: Adelaida Hirsch  MRN: [id number]  YOB: 1980  Date of evaluation: 9/17/2019  Time:  10:42 AM     Procedure Summary     Date:  09/17/19 Room / Location:  [address] [place] [address] 01 / [facility] OR    Anesthesia Start:  5470 Anesthesia Stop:      Procedure:  Right ANKLE Insicion & Drainage w/ hardware removal (Right ) Diagnosis:  (rigt ankle hardware complications)    Surgeon:  Dilia Tong MD Responsible Provider:  LOREN Mehta CRNA    Anesthesia Type:  general ASA Status:  1          Anesthesia Type: general    Shane Phase I: Shane Score: 9    Shane Phase II:      Last vitals: Reviewed and per EMR flowsheets.        Anesthesia Post Evaluation    Patient location during evaluation: PACU  Patient participation: complete - patient participated  Level of consciousness: awake and alert  Pain score: 4  Airway patency: patent  Nausea & Vomiting: no nausea  Complications: no  Cardiovascular status: blood pressure returned to baseline and hemodynamically stable  Respiratory status: acceptable  Hydration status: euvolemic

## 2019-09-20 LAB
CULTURE: ABNORMAL
CULTURE: ABNORMAL
DIRECT EXAM: ABNORMAL
DIRECT EXAM: ABNORMAL
Lab: ABNORMAL
SPECIMEN DESCRIPTION: ABNORMAL

## 2019-09-27 ENCOUNTER — TELEPHONE (OUTPATIENT)
Dept: FAMILY MEDICINE CLINIC | Age: 39
End: 2019-09-27

## 2019-09-27 DIAGNOSIS — A49.02 MRSA (METHICILLIN RESISTANT STAPHYLOCOCCUS AUREUS) INFECTION: Primary | ICD-10-CM

## 2019-09-27 RX ORDER — DOXYCYCLINE HYCLATE 100 MG
100 TABLET ORAL 2 TIMES DAILY
Qty: 20 TABLET | Refills: 0 | Status: SHIPPED | OUTPATIENT
Start: 2019-09-27 | End: 2019-10-07

## 2019-09-28 ENCOUNTER — OFFICE VISIT (OUTPATIENT)
Dept: FAMILY MEDICINE CLINIC | Age: 39
End: 2019-09-28
Payer: COMMERCIAL

## 2019-09-28 VITALS
BODY MASS INDEX: 27.12 KG/M2 | DIASTOLIC BLOOD PRESSURE: 86 MMHG | WEIGHT: 168 LBS | OXYGEN SATURATION: 98 % | SYSTOLIC BLOOD PRESSURE: 120 MMHG | HEART RATE: 101 BPM

## 2019-09-28 DIAGNOSIS — Z88.2 ALLERGY TO SULFA DRUGS: Primary | ICD-10-CM

## 2019-09-28 DIAGNOSIS — A49.02 MRSA (METHICILLIN RESISTANT STAPHYLOCOCCUS AUREUS) INFECTION: ICD-10-CM

## 2019-09-28 PROCEDURE — 99213 OFFICE O/P EST LOW 20 MIN: CPT | Performed by: FAMILY MEDICINE

## 2019-09-28 SDOH — HEALTH STABILITY: MENTAL HEALTH: HOW OFTEN DO YOU HAVE A DRINK CONTAINING ALCOHOL?: MONTHLY OR LESS

## 2019-09-28 SDOH — HEALTH STABILITY: MENTAL HEALTH: HOW MANY STANDARD DRINKS CONTAINING ALCOHOL DO YOU HAVE ON A TYPICAL DAY?: 1 OR 2

## 2019-09-28 NOTE — PROGRESS NOTES
Right ANKLE Insicion & Drainage w/ hardware removal performed by Liliana Tran MD at [facility] Right 2016    ORIF casper ankle       Family History   Problem Relation Age of Onset    Coronary Art Dis Mother     Hypertension Mother     Elevated Lipids Mother     COPD Father     No Known Problems Sister     Asthma Brother     Asthma Son        Social History     Tobacco Use    Smoking status: Former Smoker     Types: Cigarettes     Start date: 2007     Last attempt to quit: 2014     Years since quittin.0    Smokeless tobacco: Never Used   Substance Use Topics    Alcohol use: Yes     Frequency: Monthly or less     Drinks per session: 1 or 2     Binge frequency: Never      Current Outpatient Medications   Medication Sig Dispense Refill    doxycycline hyclate (VIBRA-TABS) 100 MG tablet Take 1 tablet by mouth 2 times daily for 10 days 20 tablet 0     No current facility-administered medications for this visit. Allergies   Allergen Reactions    Bactrim [Sulfamethoxazole-Trimethoprim] Rash    Morphine Nausea Only    Oxycontin [Oxycodone Hcl] Itching and Nausea Only     Ok if takes with benadryl    Betadine [Povidone Iodine] Rash       Health Maintenance   Topic Date Due    Varicella Vaccine (1 of 2 - 13+ 2-dose series) 10/29/1994    HIV screen  10/29/1996    DTaP/Tdap/Td vaccine (1 - Tdap) 10/29/2000    Cervical cancer screen  10/29/2002    Flu vaccine (1) 2019    Pneumococcal 0-64 years Vaccine  Aged Out       Subjective:      Review of Systems    Objective:     /86   Pulse 101   Wt 168 lb (76.2 kg)   LMP 09/10/2019   SpO2 98%   BMI 27.12 kg/m²     Physical Exam   Constitutional: She is oriented to person, place, and time. She appears well-developed and well-nourished. HENT:   Head: Normocephalic and atraumatic. Eyes: Conjunctivae and EOM are normal.   Neck: Normal range of motion. Neck supple.  No thyromegaly present. Cardiovascular: Normal rate, regular rhythm and intact distal pulses. Exam reveals no gallop and no friction rub. No murmur heard. Pulmonary/Chest: Effort normal and breath sounds normal. No respiratory distress. Musculoskeletal: She exhibits deformity. She exhibits no edema or tenderness. Feet:    Neurological: She is alert and oriented to person, place, and time. Skin: Skin is warm. Capillary refill takes less than 2 seconds. Psychiatric: She has a normal mood and affect. Her behavior is normal. Judgment and thought content normal.   Nursing note and vitals reviewed. Assessment/Plan:      Diagnosis Orders   1. Allergy to sulfa drugs     2. MRSA (methicillin resistant Staphylococcus aureus) infection       Review of the culture results does show that she has multi resistant methicillin-resistant staph aureus. Based on the culture results in the chart it is sensitive to doxycycline as well as to Levaquin. She is tolerating the doxycycline which is started yesterday very well. 10 days of this was sent into the pharmacy and she will continue this. Would also recommend the as needed antihistamines if she has any itching. Continue with the dry dressing to the surgical site. If she absolutely must put something on the area because of the severe itching to keep herself or itching it can use clean Vaseline only. Has a follow-up next week with the orthopedic surgeon. Lab Results   Component Value Date    WBC 6.9 09/13/2019    HGB 13.1 09/13/2019    HCT 39.5 09/13/2019     09/13/2019     09/13/2019    K 3.9 09/13/2019     09/13/2019    CREATININE 0.72 09/13/2019    BUN 11 09/13/2019    CO2 25 09/13/2019       Return for As scheduled. Patient given educational materials - see patientinstructions. Discussed use, benefit, and side effects of prescribed medications. All patient questions answered. Pt voiced understanding. Reviewed health maintenance.

## 2019-10-01 ENCOUNTER — OFFICE VISIT (OUTPATIENT)
Dept: ORTHOPEDIC SURGERY | Age: 39
End: 2019-10-01

## 2019-10-01 VITALS
HEART RATE: 80 BPM | SYSTOLIC BLOOD PRESSURE: 108 MMHG | WEIGHT: 174 LBS | HEIGHT: 65 IN | BODY MASS INDEX: 28.99 KG/M2 | DIASTOLIC BLOOD PRESSURE: 68 MMHG

## 2019-10-01 DIAGNOSIS — T81.30XA WOUND DEHISCENCE: Primary | ICD-10-CM

## 2019-10-01 PROCEDURE — 99024 POSTOP FOLLOW-UP VISIT: CPT | Performed by: PHYSICIAN ASSISTANT

## 2019-10-08 ENCOUNTER — OFFICE VISIT (OUTPATIENT)
Dept: ORTHOPEDIC SURGERY | Age: 39
End: 2019-10-08

## 2019-10-08 VITALS
BODY MASS INDEX: 27.99 KG/M2 | HEART RATE: 72 BPM | SYSTOLIC BLOOD PRESSURE: 118 MMHG | HEIGHT: 65 IN | WEIGHT: 168 LBS | DIASTOLIC BLOOD PRESSURE: 72 MMHG

## 2019-10-08 DIAGNOSIS — T81.30XA WOUND DEHISCENCE: Primary | ICD-10-CM

## 2019-10-08 PROCEDURE — 99024 POSTOP FOLLOW-UP VISIT: CPT | Performed by: PHYSICIAN ASSISTANT

## 2019-10-09 ENCOUNTER — NURSE ONLY (OUTPATIENT)
Dept: ORTHOPEDIC SURGERY | Age: 39
End: 2019-10-09

## 2019-10-22 ENCOUNTER — OFFICE VISIT (OUTPATIENT)
Dept: ORTHOPEDIC SURGERY | Age: 39
End: 2019-10-22

## 2019-10-22 VITALS
WEIGHT: 168 LBS | SYSTOLIC BLOOD PRESSURE: 110 MMHG | DIASTOLIC BLOOD PRESSURE: 70 MMHG | HEIGHT: 65 IN | BODY MASS INDEX: 27.99 KG/M2 | OXYGEN SATURATION: 99 % | HEART RATE: 90 BPM

## 2019-10-22 DIAGNOSIS — T81.30XA WOUND DEHISCENCE: Primary | ICD-10-CM

## 2019-10-22 PROCEDURE — 99024 POSTOP FOLLOW-UP VISIT: CPT | Performed by: NURSE PRACTITIONER

## 2019-11-19 ENCOUNTER — OFFICE VISIT (OUTPATIENT)
Dept: ORTHOPEDIC SURGERY | Age: 39
End: 2019-11-19

## 2019-11-19 VITALS
HEART RATE: 100 BPM | WEIGHT: 168 LBS | DIASTOLIC BLOOD PRESSURE: 80 MMHG | SYSTOLIC BLOOD PRESSURE: 126 MMHG | HEIGHT: 65 IN | BODY MASS INDEX: 27.99 KG/M2

## 2019-11-19 DIAGNOSIS — T81.30XA WOUND DEHISCENCE: Primary | ICD-10-CM

## 2019-11-19 PROCEDURE — 99024 POSTOP FOLLOW-UP VISIT: CPT | Performed by: PHYSICIAN ASSISTANT

## 2021-02-17 ENCOUNTER — TELEPHONE (OUTPATIENT)
Dept: FAMILY MEDICINE CLINIC | Age: 41
End: 2021-02-17

## 2021-02-17 NOTE — TELEPHONE ENCOUNTER
Spoke with patient- she states that she feels great today. She states that her bp has been running high. She gets out of quarantine tonight but is ok with doing a video visit. Appointment scheduled tomorrow for virtual visit.

## 2021-02-18 ENCOUNTER — VIRTUAL VISIT (OUTPATIENT)
Dept: FAMILY MEDICINE CLINIC | Age: 41
End: 2021-02-18
Payer: COMMERCIAL

## 2021-02-18 DIAGNOSIS — U07.1 COVID-19 VIRUS INFECTION: Primary | ICD-10-CM

## 2021-02-18 DIAGNOSIS — R07.1 CHEST PAIN ON BREATHING: ICD-10-CM

## 2021-02-18 PROCEDURE — 99204 OFFICE O/P NEW MOD 45 MIN: CPT | Performed by: FAMILY MEDICINE

## 2021-02-18 RX ORDER — ASPIRIN 81 MG/1
81 TABLET ORAL DAILY
COMMUNITY

## 2021-02-18 SDOH — ECONOMIC STABILITY: FOOD INSECURITY: WITHIN THE PAST 12 MONTHS, YOU WORRIED THAT YOUR FOOD WOULD RUN OUT BEFORE YOU GOT MONEY TO BUY MORE.: NEVER TRUE

## 2021-02-18 SDOH — ECONOMIC STABILITY: INCOME INSECURITY: HOW HARD IS IT FOR YOU TO PAY FOR THE VERY BASICS LIKE FOOD, HOUSING, MEDICAL CARE, AND HEATING?: NOT HARD AT ALL

## 2021-02-18 SDOH — ECONOMIC STABILITY: TRANSPORTATION INSECURITY
IN THE PAST 12 MONTHS, HAS THE LACK OF TRANSPORTATION KEPT YOU FROM MEDICAL APPOINTMENTS OR FROM GETTING MEDICATIONS?: NO

## 2021-02-18 ASSESSMENT — ENCOUNTER SYMPTOMS
WHEEZING: 0
SORE THROAT: 1
COUGH: 0
RHINORRHEA: 1

## 2021-02-18 ASSESSMENT — PATIENT HEALTH QUESTIONNAIRE - PHQ9
2. FEELING DOWN, DEPRESSED OR HOPELESS: 0
SUM OF ALL RESPONSES TO PHQ QUESTIONS 1-9: 0
1. LITTLE INTEREST OR PLEASURE IN DOING THINGS: 0

## 2021-02-18 NOTE — PROGRESS NOTES
2021    TELEHEALTH EVALUATION -- Audio/Visual (During [id number] public health emergency)    Chief Complaint   Patient presents with    Follow-up     [facility] 21, feelings a lot better, chest pain and numbness and tingling in arms         Zaira Bellashandra (:  1980) has requested an audio/video evaluation for the following concern(s):    Really weird mix-- everyone has something in the last few weeks. Everyone got over it really quickly but then she got sick on -- fever and chills. Over the weekend felt better but then on Sat felt worse. Had right sided chest pain and arms started to get numb. Had tachycardia. Chest pain still comes and goes but much better. Still a lot of dizziness mariama at bedtime. Feels like just when \"about to go under anesthetic\"  CT scan in the ER showed LLL pneumonia no PE. Ddimer slightly elevated at 261. Labs otherwise unremarkable. URI   This is a new problem. There has been no fever (had fever initially but then had resolved earlier in the week). Associated symptoms include chest pain (started up on Saturday -RIGHT sided), congestion, rhinorrhea and a sore throat. Pertinent negatives include no coughing or wheezing. She has tried NSAIDs for the symptoms. The treatment provided mild relief. Reviewed ER notes, labs and CT scans/ XR. Review of Systems   HENT: Positive for congestion, rhinorrhea and sore throat. Respiratory: Negative for cough and wheezing. Cardiovascular: Positive for chest pain (started up on Saturday -RIGHT sided). Prior to Visit Medications    Medication Sig Taking?  Authorizing Provider   Probiotic Product (PROBIOTIC-10 PO) Take by mouth Yes Historical Provider, MD   aspirin 81 MG EC tablet Take 81 mg by mouth daily Yes Historical Provider, MD       Social History     Tobacco Use    Smoking status: Former Smoker     Types: Cigarettes     Start date: 2007     Quit date: 2014     Years since quittin.3    Smokeless tobacco: Never Used   Substance Use Topics    Alcohol use: Yes     Frequency: Monthly or less     Drinks per session: 1 or 2     Binge frequency: Never    Drug use: No        Allergies   Allergen Reactions    Bactrim [Sulfamethoxazole-Trimethoprim] Rash    Morphine Nausea Only    Oxycontin [Oxycodone Hcl] Itching and Nausea Only     Ok if takes with benadryl    Betadine [Povidone Iodine] Rash   , No past medical history on file.,   Past Surgical History:   Procedure Laterality Date    ANKLE SURGERY Right 2019    Right ANKLE Insicion & Drainage w/ hardware removal performed by Ketan Mujica MD at [facility] Right 2016    ORIF casper ankle   ,   Social History     Tobacco Use    Smoking status: Former Smoker     Types: Cigarettes     Start date: 2007     Quit date: 2014     Years since quittin.3    Smokeless tobacco: Never Used   Substance Use Topics    Alcohol use: Yes     Frequency: Monthly or less     Drinks per session: 1 or 2     Binge frequency: Never    Drug use: No   ,   Family History   Problem Relation Age of Onset    Coronary Art Dis Mother     Hypertension Mother     Elevated Lipids Mother     COPD Father     No Known Problems Sister     Asthma Brother     Asthma Son    ,   There is no immunization history on file for this patient.,   Health Maintenance   Topic Date Due    Hepatitis C screen  1980    Varicella vaccine (1 of 2 - 2-dose childhood series) 10/29/1982    HIV screen  10/29/1996    DTaP/Tdap/Td vaccine (1 - Tdap) 10/29/2000    Cervical cancer screen  10/29/2002    Flu vaccine (1) 2020    Hepatitis A vaccine  Aged Out    Hepatitis B vaccine  Aged Out    Hib vaccine  Aged Out    Meningococcal (ACWY) vaccine  Aged Out    Pneumococcal 0-64 years Vaccine  Aged Out       PHYSICAL EXAMINATION:  [ INSTRUCTIONS:  \"[x]\" Indicates a positive item  \"[]\" Indicates a negative item  -- DELETE ALL ITEMS NOT EXAMINED]  Vital Signs: (As obtained by patient/caregiver or practitioner observation)  Patient-Reported Vitals 2/18/2021   Patient-Reported Systolic 239   Patient-Reported Diastolic 81   Patient-Reported Pulse 80   Patient-Reported SpO2 98         Constitutional: [x] Appears well-developed and well-nourished [x] No apparent distress      [] Abnormal-   Mental status  [x] Alert and awake  [x] Oriented to person/place/time [x]Able to follow commands      Eyes:  EOM    [x]  Normal  [] Abnormal-  Sclera  [x]  Normal  [] Abnormal -         Discharge [x]  None visible  [] Abnormal -    HENT:   [x] Normocephalic, atraumatic. [] Abnormal   [x] Mouth/Throat: Mucous membranes are moist.     External Ears [x] Normal  [] Abnormal-     Neck: [x] No visualized mass     Pulmonary/Chest: [x] Respiratory effort normal.  [x] No visualized signs of difficulty breathing or respiratory distress        [] Abnormal-      Musculoskeletal:   [] Normal gait with no signs of ataxia         [x] Normal range of motion of neck        [] Abnormal-       Neurological:        [x] No Facial Asymmetry (Cranial nerve 7 motor function) (limited exam to video visit)          [x] No gaze palsy        [] Abnormal-         Skin:        [x] No significant exanthematous lesions or discoloration noted on facial skin         [] Abnormal-            Psychiatric:       [x] Normal Affect [] No Hallucinations        [] Abnormal-     Other pertinent observable physical exam findings-     Due to this being a TeleHealth encounter, evaluation of the following organ systems is limited: Vitals/Constitutional/EENT/Resp/CV/GI//MS/Neuro/Skin/Heme-Lymph-Imm. ASSESSMENT/PLAN:  1. COVID-19 virus infection  Continue with supportive care-- if there are signs or symptoms of pneumonia such as fever, increasing SOB, chills then would consider antibiotics for possible secondary bacterial pneumonia.   At this time as she is improving will continue with the supportive treatment. Continue with the baby ASpirin daily at this time to prevent secondary thrombotic events. 2. Chest pain on breathing  Continue supportive treatment. Return if symptoms worsen or fail to improve. An  electronic signature was used to authenticate this note. --Jerilyn Ash MD on 2/18/2021 at 11:10 AM        Pursuant to the emergency declaration under the [address] [place] [address] [facility] authority and the [employer] Act, this Virtual  Visit was conducted, with patient's consent, to reduce the patient's risk of exposure to COVID-19 and provide continuity of care for an established patient. Patient location: patient home  Provider location: provider clinic  Services were provided through a video synchronous discussion virtually to substitute for in-person clinic visit.

## 2021-02-18 NOTE — LETTER
[facility] department of [facility]  [address]  Phone: [phone]  Fax: [phone]    Russell Dakins, MD        February 18, 2021     Patient: Taylor Henriquez   YOB: 1980   Date of Visit: 2/18/2021       To Whom It May Concern: It is my medical opinion that Christiano Jean Baptiste may return to work on 2/19/2021 without restrictions. Please excuse from 2/13/21-2/18/21. If you have any questions or concerns, please don't hesitate to call.     Sincerely,        Russell Dakins, MD

## 2021-04-07 ENCOUNTER — OFFICE VISIT (OUTPATIENT)
Dept: FAMILY MEDICINE CLINIC | Age: 41
End: 2021-04-07
Payer: COMMERCIAL

## 2021-04-07 VITALS
HEART RATE: 111 BPM | DIASTOLIC BLOOD PRESSURE: 82 MMHG | WEIGHT: 177 LBS | SYSTOLIC BLOOD PRESSURE: 110 MMHG | BODY MASS INDEX: 29.45 KG/M2 | OXYGEN SATURATION: 99 %

## 2021-04-07 DIAGNOSIS — R07.89 CHEST PRESSURE: ICD-10-CM

## 2021-04-07 DIAGNOSIS — R00.0 TACHYCARDIA: ICD-10-CM

## 2021-04-07 DIAGNOSIS — R53.81 POST VIRAL DEBILITY: ICD-10-CM

## 2021-04-07 DIAGNOSIS — R06.02 SOB (SHORTNESS OF BREATH) ON EXERTION: Primary | ICD-10-CM

## 2021-04-07 DIAGNOSIS — Z86.16 HISTORY OF 2019 NOVEL CORONAVIRUS DISEASE (COVID-19): ICD-10-CM

## 2021-04-07 DIAGNOSIS — B94.8 POST VIRAL DEBILITY: ICD-10-CM

## 2021-04-07 DIAGNOSIS — R53.82 CHRONIC FATIGUE: ICD-10-CM

## 2021-04-07 LAB
ALBUMIN/GLOBULIN RATIO: 1.4 G/DL
ALBUMIN: 4.7 G/DL (ref 3.5–5)
ALP BLD-CCNC: 78 UNITS/L (ref 38–126)
ALT SERPL-CCNC: 15 UNITS/L (ref 4–35)
ANION GAP SERPL CALCULATED.3IONS-SCNC: 9 MMOL/L
AST SERPL-CCNC: 27 UNITS/L (ref 14–36)
BASOPHILS %: 1.4 (ref 0–3)
BASOPHILS ABSOLUTE: 0.1 (ref 0–0.3)
BILIRUB SERPL-MCNC: 0.1 MG/DL (ref 0.2–1.3)
BUN BLDV-MCNC: 11 MG/DL (ref 7–17)
CALCIUM SERPL-MCNC: 9.4 MG/DL (ref 8.4–10.2)
CHLORIDE BLD-SCNC: 103 MMOL/L (ref 98–120)
CO2: 26 MMOL/L (ref 22–31)
CREAT SERPL-MCNC: 0.6 MG/DL (ref 0.5–1)
D-DIMER QUANTITATIVE: < 245 NG/DL (ref 0–245)
EOSINOPHILS %: 1.33 (ref 0–10)
EOSINOPHILS ABSOLUTE: 0.1 (ref 0–1.1)
GFR CALCULATED: > 60
GLOBULIN: 3.3 G/DL
GLUCOSE: 91 MG/DL (ref 65–105)
HCT VFR BLD CALC: 35.3 % (ref 37–47)
HEMOGLOBIN: 11.2 (ref 12–16)
LYMPHOCYTE %: 22.64 (ref 20–51.1)
LYMPHOCYTES ABSOLUTE: 1.67 (ref 1–5.5)
MCH RBC QN AUTO: 26.6 PG (ref 28.5–32.5)
MCHC RBC AUTO-ENTMCNC: 31.7 G/DL (ref 32–37)
MCV RBC AUTO: 84 FL (ref 80–94)
MONOCYTES %: 5.92 (ref 1.7–9.3)
MONOCYTES ABSOLUTE: 0.44 (ref 0.1–1)
NEUTROPHILS %: 68.71 (ref 42.2–75.2)
NEUTROPHILS ABSOLUTE: 5.07 (ref 2–8.1)
PDW BLD-RTO: 13.9 % (ref 8.5–15.5)
PLATELET # BLD: 370.8 THOU/MM3 (ref 130–400)
POTASSIUM SERPL-SCNC: 3.9 MMOL/L (ref 3.6–5)
RBC: 4.2 M/UL (ref 4.2–5.4)
SODIUM BLD-SCNC: 139 MMOL/L (ref 135–145)
TOTAL PROTEIN, SERUM: 8 G/DL (ref 6.3–8.2)
TSH REFLEX FT4: 1.81 MIU/ML (ref 0.49–4.67)
WBC: 7.4 THOU/ML3 (ref 4.8–10.8)

## 2021-04-07 PROCEDURE — 93000 ELECTROCARDIOGRAM COMPLETE: CPT | Performed by: FAMILY MEDICINE

## 2021-04-07 PROCEDURE — 99214 OFFICE O/P EST MOD 30 MIN: CPT | Performed by: FAMILY MEDICINE

## 2021-04-07 ASSESSMENT — PATIENT HEALTH QUESTIONNAIRE - PHQ9
SUM OF ALL RESPONSES TO PHQ QUESTIONS 1-9: 0
SUM OF ALL RESPONSES TO PHQ QUESTIONS 1-9: 0
SUM OF ALL RESPONSES TO PHQ9 QUESTIONS 1 & 2: 0
2. FEELING DOWN, DEPRESSED OR HOPELESS: 0

## 2021-04-07 NOTE — PROGRESS NOTES
[facility]  [address]  Dept: [phone]  Dept Angy Hall is a 45 y.o. female who presents today for her medical conditions/complaints as notedbelow. Deepali Danielle is c/o of Dizziness (had covid two months ago still having symptoms), Shortness of Breath, and Shaking      HPI:     HPI      Has been trying to increase her activity gradually in the last few weeks but every time she dose any activity she will feel her heart rate take off-- as high as 185-- will get very SOB with this. Checks with the pulse OX and the arm blood pressure cuff. Will get very SOB with any activity-- can not walk and talk at the same time. Will have chest pressure all the time. Will feel these episodes at least a few days per week. When she was in the ER with the COVID it was the heart racing that brought her in to begin with. The HR was in the 160's in the bed. She was actually surprised when she was diagnosed with the Covid. She really had very minimal URI type symptoms at that time. Got over it fairly well with the exception of the shortness of breath and tachycardia. Has cut out smoking, caffeine etc. Has had more sleep, more rest, fatigue. Drinking more water. Has had resting tachycardia. Feels her heart rate drop and then come back up. Sleeps well. Her heart will wake her up. Getting up at night. A little cough the last few days with her being outside with the being outside in the wind/ allergies. Was on an inhaler when she was young with her asthma. Has a family history of asthma and family history of MV regurg. Bowels are normal Appetite is normal. Has gained weight but feels like this is related to inactivity. Even going up the stairs makes her feel tired. Periods the last few cycles have been \"horrible\" bleeding hard and clotting very hard. Large clots. Has never had that bad.  Periods have been heavy for years. Has also been taking a baby aspirin per day. Has not had hot flashes night sweats. No leg swelling, no calf tenderness noted. No chest pain noted. BP Readings from Last 3 Encounters:   21 110/82   19 126/80   10/22/19 110/70          (goal 120/80)    Wt Readings from Last 3 Encounters:   21 177 lb (80.3 kg)   19 168 lb (76.2 kg)   10/22/19 168 lb (76.2 kg)        History reviewed. No pertinent past medical history. Past Surgical History:   Procedure Laterality Date    ANKLE SURGERY Right 2019    Right ANKLE Insicion & Drainage w/ hardware removal performed by Rachna Saunders MD at [address] OTHER SURGICAL HISTORY Right 2016    ORIF casper ankle       Family History   Problem Relation Age of Onset    Coronary Art Dis Mother 48    Hypertension Mother     Elevated Lipids Mother     Thyroid Disease Mother     COPD Father     No Known Problems Sister     Asthma Brother     Asthma Son        Social History     Tobacco Use    Smoking status: Former Smoker     Types: Cigarettes     Start date: 2007     Quit date: 2014     Years since quittin.5    Smokeless tobacco: Never Used   Substance Use Topics    Alcohol use: Yes     Frequency: Monthly or less     Drinks per session: 1 or 2     Binge frequency: Never      Current Outpatient Medications   Medication Sig Dispense Refill    Probiotic Product (PROBIOTIC-10 PO) Take by mouth      aspirin 81 MG EC tablet Take 81 mg by mouth daily       No current facility-administered medications for this visit.       Allergies   Allergen Reactions    Bactrim [Sulfamethoxazole-Trimethoprim] Rash    Morphine Nausea Only    Oxycontin [Oxycodone Hcl] Itching and Nausea Only     Ok if takes with benadryl    Betadine [Povidone Iodine] Rash       Health Maintenance   Topic Date Due    Hepatitis C screen  Never done    Varicella vaccine (1 of 2 - 2-dose childhood series) Never done   Jose Romano HIV screen  Never done    COVID-19 Vaccine (1) Never done    DTaP/Tdap/Td vaccine (1 - Tdap) Never done    Cervical cancer screen  Never done    Flu vaccine (Season Ended) 09/01/2021    Hepatitis A vaccine  Aged Out    Hepatitis B vaccine  Aged Out    Hib vaccine  Aged Out    Meningococcal (ACWY) vaccine  Aged Out    Pneumococcal 0-64 years Vaccine  Aged Out       Subjective:      Review of Systems    Objective:     /82 (Site: Left Upper Arm, Position: Sitting, Cuff Size: Large Adult)   Pulse 111   Wt 177 lb (80.3 kg)   SpO2 99%   BMI 29.45 kg/m²     Physical Exam  Vitals signs and nursing note reviewed. Constitutional:       Appearance: Normal appearance. She is well-developed. HENT:      Head: Normocephalic and atraumatic. Eyes:      Conjunctiva/sclera: Conjunctivae normal.   Neck:      Musculoskeletal: Normal range of motion and neck supple. Thyroid: No thyromegaly. Vascular: No JVD. Cardiovascular:      Rate and Rhythm: Normal rate and regular rhythm. Pulses: Normal pulses. Heart sounds: Normal heart sounds. No murmur. No friction rub. No gallop. Comments: After sitting for short period of time heart rate does come below 100 but with any exertion such as getting up to the exam table or walking back to the room her heart rate does go above 100. Pulmonary:      Effort: Pulmonary effort is normal. No respiratory distress. Breath sounds: Normal breath sounds. Abdominal:      Palpations: Abdomen is soft. Musculoskeletal:      Right lower leg: No edema. Left lower leg: No edema. Lymphadenopathy:      Cervical: No cervical adenopathy. Skin:     General: Skin is warm. Neurological:      General: No focal deficit present. Mental Status: She is alert and oriented to person, place, and time. Assessment/Plan:     1.  SOB (shortness of breath) on exertion  -     TSH With Reflex Ft4; Future  -     CBC Auto Differential; Future  - made to edit the dictation but occasionally words are mis-transcribed.)    Electronically signed by Napoleon George MD on 4/11/2021

## 2021-04-30 DIAGNOSIS — R00.0 TACHYCARDIA: ICD-10-CM

## 2021-04-30 DIAGNOSIS — R06.02 SOB (SHORTNESS OF BREATH) ON EXERTION: ICD-10-CM

## 2021-05-06 ENCOUNTER — TELEPHONE (OUTPATIENT)
Dept: FAMILY MEDICINE CLINIC | Age: 41
End: 2021-05-06

## 2024-02-12 LAB
IRON % SATURATION: 8 % (ref 15–38)
IRON: 35 MG/DL (ref 37–170)
TOTAL IRON BINDING CAPACITY: 438 MG/DL (ref 261–497)

## 2024-02-13 LAB
IMMATURE RETIC FRACT: 22.5 % (ref 2.7–18.3)
RETIC HEMOGLOBIN: 20 PG (ref 28.2–35.7)
RETIC: 3.3 % (ref 0.5–1.9)
RETICULOCYTE ABSOLUTE COUNT: 0.1 M/UL (ref 0.03–0.08)

## 2024-02-14 ENCOUNTER — TELEPHONE (OUTPATIENT)
Dept: FAMILY MEDICINE CLINIC | Age: 44
End: 2024-02-14

## 2024-02-15 NOTE — RESULT ENCOUNTER NOTE
Notify the retic count is elevated-- this shows her body is responding appropriately to the anemia. Should also have a follow up appointment scheduled next week after her transfusions complete so we can review the results and make a plan

## 2024-02-15 NOTE — TELEPHONE ENCOUNTER
Care Transitions Initial Follow Up Call    Outreach made within 2 business days of discharge: Yes    Patient: Mary Caputo Patient : 1980   MRN: [id number]  Reason for Admission: anemia  Discharge Date: 24       Spoke with: patient    Discharge department/facility: [facility] Interactive Patient Contact:  Was patient able to fill all prescriptions: Yes  Was patient instructed to bring all medications to the follow-up visit: Yes  Is patient taking all medications as directed in the discharge summary? Yes  Does patient understand their discharge instructions: Yes  Does patient have questions or concerns that need addressed prior to 7-14 day follow up office visit: no She is scheduled for final iron infusion next Wednesday.     Scheduled appointment with PCP within 7-14 days    Follow Up  Future Appointments   Date Time Provider Department Center   2024 11:00 AM [facility]       Kimberly Loza LPN

## 2024-02-23 ENCOUNTER — OFFICE VISIT (OUTPATIENT)
Dept: FAMILY MEDICINE CLINIC | Age: 44
End: 2024-02-23

## 2024-02-23 VITALS
HEIGHT: 65 IN | DIASTOLIC BLOOD PRESSURE: 68 MMHG | HEART RATE: 98 BPM | SYSTOLIC BLOOD PRESSURE: 118 MMHG | WEIGHT: 182 LBS | BODY MASS INDEX: 30.32 KG/M2 | OXYGEN SATURATION: 100 %

## 2024-02-23 DIAGNOSIS — Z09 HOSPITAL DISCHARGE FOLLOW-UP: ICD-10-CM

## 2024-02-23 DIAGNOSIS — N92.0 MENORRHAGIA WITH REGULAR CYCLE: ICD-10-CM

## 2024-02-23 DIAGNOSIS — D50.0 BLOOD LOSS ANEMIA: Primary | ICD-10-CM

## 2024-02-23 DIAGNOSIS — D50.0 IRON DEFICIENCY ANEMIA DUE TO CHRONIC BLOOD LOSS: ICD-10-CM

## 2024-02-23 LAB
ANISOCYTOSIS: NORMAL
BASOPHILS %: 1.12 (ref 0–3)
BASOPHILS ABSOLUTE: 0.08 (ref 0–0.3)
EOSINOPHILS %: 3.62 (ref 0–10)
EOSINOPHILS ABSOLUTE: 0.25 (ref 0–1.1)
FOLATE: 13.5 NG/ML (ref 2.8–20)
HCT VFR BLD CALC: 33.2 % (ref 37–47)
HEMOGLOBIN: 9.5 (ref 12–16)
HYPOCHROMIA: NORMAL
IRON % SATURATION: 29 % (ref 15–38)
IRON: 97 MG/DL (ref 37–170)
LYMPHOCYTE %: 29.33 (ref 20–51.1)
LYMPHOCYTES ABSOLUTE: 1.99 (ref 1–5.5)
MCH RBC QN AUTO: 24.5 PG (ref 28.5–32.5)
MCHC RBC AUTO-ENTMCNC: 28.7 G/DL (ref 32–37)
MCV RBC AUTO: 85.2 FL (ref 80–94)
MONOCYTES %: 5.39 (ref 1.7–9.3)
MONOCYTES ABSOLUTE: 0.37 (ref 0.1–1)
NEUTROPHILS %: 60.53 (ref 42.2–75.2)
NEUTROPHILS ABSOLUTE: 4.11 (ref 2–8.1)
PDW BLD-RTO: 22 % (ref 8.5–15.5)
PLATELET # BLD: 409.6 THOU/MM3 (ref 130–400)
RBC: 3.89 M/UL (ref 4.2–5.4)
TOTAL IRON BINDING CAPACITY: 336 MG/DL (ref 261–497)
VITAMIN B-12: 895 PG/ML (ref 239–931)
WBC: 6.8 THOU/ML3 (ref 4.8–10.8)

## 2024-02-23 RX ORDER — CHOLECALCIFEROL (VITAMIN D3) 125 MCG
500 CAPSULE ORAL DAILY
COMMUNITY

## 2024-02-23 SDOH — ECONOMIC STABILITY: FOOD INSECURITY: WITHIN THE PAST 12 MONTHS, YOU WORRIED THAT YOUR FOOD WOULD RUN OUT BEFORE YOU GOT MONEY TO BUY MORE.: NEVER TRUE

## 2024-02-23 SDOH — ECONOMIC STABILITY: FOOD INSECURITY: WITHIN THE PAST 12 MONTHS, THE FOOD YOU BOUGHT JUST DIDN'T LAST AND YOU DIDN'T HAVE MONEY TO GET MORE.: NEVER TRUE

## 2024-02-23 SDOH — ECONOMIC STABILITY: HOUSING INSECURITY
IN THE LAST 12 MONTHS, WAS THERE A TIME WHEN YOU DID NOT HAVE A STEADY PLACE TO SLEEP OR SLEPT IN A SHELTER (INCLUDING NOW)?: NO

## 2024-02-23 SDOH — ECONOMIC STABILITY: INCOME INSECURITY: HOW HARD IS IT FOR YOU TO PAY FOR THE VERY BASICS LIKE FOOD, HOUSING, MEDICAL CARE, AND HEATING?: NOT HARD AT ALL

## 2024-02-23 ASSESSMENT — PATIENT HEALTH QUESTIONNAIRE - PHQ9
SUM OF ALL RESPONSES TO PHQ QUESTIONS 1-9: 0
2. FEELING DOWN, DEPRESSED OR HOPELESS: 0
SUM OF ALL RESPONSES TO PHQ QUESTIONS 1-9: 0
SUM OF ALL RESPONSES TO PHQ9 QUESTIONS 1 & 2: 0
SUM OF ALL RESPONSES TO PHQ QUESTIONS 1-9: 0
SUM OF ALL RESPONSES TO PHQ QUESTIONS 1-9: 0
1. LITTLE INTEREST OR PLEASURE IN DOING THINGS: 0

## 2024-02-23 NOTE — PROGRESS NOTES
Post-Discharge Transitional Care Follow Up    Mary Caputo   YOB: 1980    Date of Office Visit:  2/23/2024  Date of Hospital Admission: 2/12/24  Date of Hospital Discharge: 2/13/24    Care management risk score Rising risk (score 2-5) and Complex Care (Scores >=6): No Risk Score On File     Non face to face  following discharge, date last encounter closed (first attempt may have been earlier): 02/14/2024     Call initiated 2 business days of discharge: Yes    ASSESSMENT/PLAN:   Blood loss anemia  -     Iron and TIBC; Future  -     CBC with Auto Differential; Future  -     Vitamin B12 & Folate; Future  -     External Referral To Ob-Gyn  -     CBC with Auto Differential; Future  -     Iron and TIBC; Future  Iron deficiency anemia due to chronic blood loss  Hospital discharge follow-up  -     MI DISCHARGE MEDS RECONCILED W/ CURRENT OUTPATIENT MED LIST  Menorrhagia with regular cycle  -     External Referral To Ob-Gyn    Has appointment set up with [facility] and to address the menstrual blood loss.  Also recommend she eye evaluation with a minimum of a colonoscopy.  Would also consider addressing the hemorrhoids that are longstanding at that time.  She will let us know after her GYN appointment who she would like to see for colonoscopy.    Symptoms significantly improved with the iron transfusions.  Repeat labs today to check on the CBC and iron levels.  Oral iron to help with the further iron replacement.  Based on those results will repeat labs in the future.    Continue with the high-dose NSAIDs around the menstrual cycle to help decrease cramping and blood loss.  Medical Decision Making: moderate complexity    Return in about 4 months (around 6/23/2024).           Subjective:   COTY Person was admitted to the hospital with dizziness over the last month.  She had a profound dizzy lightheaded episode and nearly passed out at home.  She was brought to the emergency room.  She was found that her

## 2024-05-06 ENCOUNTER — TELEPHONE (OUTPATIENT)
Dept: FAMILY MEDICINE CLINIC | Age: 44
End: 2024-05-06

## 2024-05-06 DIAGNOSIS — K62.5 RECTAL BLEEDING: ICD-10-CM

## 2024-05-06 DIAGNOSIS — D50.0 BLOOD LOSS ANEMIA: Primary | ICD-10-CM

## 2024-05-06 NOTE — TELEPHONE ENCOUNTER
----- Message from Vanessa Reyes MD sent at 2/25/2024  4:12 PM EST -----  Regarding: colonoscopy referral  Verify referral for the colonoscopy has been done-- discussed at her previous appointments-- referral placed.  Notify patient

## (undated) DEVICE — SUTURE ETHLN SZ 3-0 L18IN NONABSORBABLE BLK L24MM PS-1 3/8 1663G

## (undated) DEVICE — BANDAGE COMPR W4INXL5YD WHT BGE POLY COT M E WRP WV HK AND

## (undated) DEVICE — TUBING, SUCTION, 3/16" X 20', STRAIGHT: Brand: MEDLINE

## (undated) DEVICE — GLOVE SURG SZ 85 L12IN FNGR THK13MIL WHT ISOLEX POLYISOPRENE

## (undated) DEVICE — INTENDED FOR TISSUE SEPARATION, AND OTHER PROCEDURES THAT REQUIRE A SHARP SURGICAL BLADE TO PUNCTURE OR CUT.: Brand: BARD-PARKER ® CARBON RIB-BACK BLADES

## (undated) DEVICE — PACK PROCEDURE SURG EXTREMITY MIN

## (undated) DEVICE — PACK SURG PROC REMINDER N WVN DISPOSABLE BEAC TIME OUT

## (undated) DEVICE — GOWN,AURORA,NON-REINFORCED,2XL: Brand: MEDLINE

## (undated) DEVICE — BANDAGE COMPR W6INXL5YD WHT BGE POLY COT M E WRP WV HK AND

## (undated) DEVICE — DRAPE,U/ SHT,SPLIT,PLAS,STERIL: Brand: MEDLINE

## (undated) DEVICE — 9165 UNIVERSAL PATIENT PLATE: Brand: 3M™

## (undated) DEVICE — SOLUTION IV IRRIG POUR BRL 0.9% SODIUM CHL 2F7124

## (undated) DEVICE — SUTURE ETHLN SZ 3-0 L30IN NONABSORBABLE BLK L36MM FSLX 3/8 1673BH

## (undated) DEVICE — CHLORAPREP 26ML ORANGE

## (undated) DEVICE — SUTURE MCRYL SZ 2-0 L27IN ABSRB UD SH L26MM TAPERPOINT NDL Y417H

## (undated) DEVICE — SKIN AFFIX SURG ADHESIVE 72/CS 0.55ML: Brand: MEDLINE

## (undated) DEVICE — SPONGE LAP W18XL18IN WHT COT 4 PLY FLD STRUNG RADPQ DISP ST

## (undated) DEVICE — 1200CC GUARDIAN II: Brand: GUARDIAN

## (undated) DEVICE — COVER LT HNDL BLU PLAS

## (undated) DEVICE — PENCIL ES L3M BTTN SWCH HOLSTER W/ BLDE ELECTRD EDGE

## (undated) DEVICE — SCRUB DRY SURG EZ SCRUB BRUSH PREOPERATIVE GRN